# Patient Record
Sex: MALE | Race: WHITE | NOT HISPANIC OR LATINO | Employment: FULL TIME | URBAN - METROPOLITAN AREA
[De-identification: names, ages, dates, MRNs, and addresses within clinical notes are randomized per-mention and may not be internally consistent; named-entity substitution may affect disease eponyms.]

---

## 2022-04-10 ENCOUNTER — HISTORICAL (OUTPATIENT)
Dept: ADMINISTRATIVE | Facility: HOSPITAL | Age: 32
End: 2022-04-10

## 2022-04-25 VITALS
OXYGEN SATURATION: 99 % | DIASTOLIC BLOOD PRESSURE: 85 MMHG | HEIGHT: 66 IN | WEIGHT: 170 LBS | BODY MASS INDEX: 27.32 KG/M2 | SYSTOLIC BLOOD PRESSURE: 128 MMHG

## 2022-06-27 DIAGNOSIS — C62.90 TESTICULAR CANCER: Primary | ICD-10-CM

## 2022-07-13 ENCOUNTER — DOCUMENTATION ONLY (OUTPATIENT)
Dept: HEMATOLOGY/ONCOLOGY | Facility: CLINIC | Age: 32
End: 2022-07-13
Payer: OTHER GOVERNMENT

## 2022-07-13 NOTE — PROGRESS NOTES
Subjective:       Patient ID: Toño Steiner is a 32 y.o. male.    Chief Complaint: Consult (Testicular Cancer)      Diagnosis:  1.  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    Current Treatment:   1.  Observation    Treatment History:  1.  Left orchiectomy performed on 6/27/2022.     HPI  32-year-old male who noticed discomfort in his left testicle while working out, did a self exam and felt a lump in the left testicle.  He presented to the emergency department at [facility], underwent testicular ultrasound on 05/25/2022 that revealed a solid hypervascular mass in the left testis measuring 1.7 cm consistent with testicular neoplasm.  A CT scan of the chest with contrast done on 05/25/2022 revealed no evidence of metastatic disease.  CT of the abdomen and pelvis with and without contrast done on 05/25/2022 revealed a 1.6 x 1.3 cm left testicular mass with no evidence of metastatic disease.  Patient was admitted to the hospital.  Pre orchiectomy labs showed an AFP of 2, beta-hCG less than 1 and LDH of 187, these are all normal. He underwent a left orchiectomy on 05/27/2022, pathology revealed pure seminoma, 1.7 cm, pathologically staged as pT1a, N0, M0 stage I pure seminoma.  In early July 2022, he had an ultrasound that showed a hydrocele in no other abnormalities.  He presented to see me for initial consult on 07/14/2022.  At that visit, he had recovered from surgery.  He had no major complaints to discuss.      Interval History:   Patient presents to clinic for initial consult.      Past Medical History:   Diagnosis Date    Anxiety     Diverticulitis     Testicular cancer       Past Surgical History:   Procedure Laterality Date    COLONOSCOPY  2021    ORCHIECTOMY, LAPAROSCOPIC Left 05/26/2022     Social History     Socioeconomic History    Marital status:    Tobacco Use    Smoking status: Never Smoker    Smokeless tobacco: Current User    Tobacco comment: patient stated that  he chews nicotine gum at times   Substance and Sexual Activity    Alcohol use: Not Currently     Comment: patient stated that he has not drank in over 9 years.     Drug use: Never      Family History   Problem Relation Age of Onset    Hypertension Mother     Multiple myeloma Father     Hypertension Maternal Uncle     Diabetes Maternal Uncle     Hypertension Maternal Grandmother     Hypertension Maternal Grandfather       Review of patient's allergies indicates:  No Known Allergies   Review of Systems   Constitutional: Negative for appetite change and unexpected weight change.   HENT: Negative for mouth sores.    Eyes: Negative for visual disturbance.   Respiratory: Negative for cough and shortness of breath.    Cardiovascular: Negative for chest pain.   Gastrointestinal: Negative for abdominal pain and diarrhea.   Genitourinary: Negative for frequency.   Musculoskeletal: Negative for back pain.   Integumentary:  Negative for rash.   Neurological: Negative for headaches.   Hematological: Negative for adenopathy.   Psychiatric/Behavioral: The patient is nervous/anxious.          Objective:      Physical Exam  Vitals reviewed.   Constitutional:       General: He is awake.      Appearance: Normal appearance.   HENT:      Head: Normocephalic and atraumatic.      Right Ear: Hearing normal.      Left Ear: Hearing normal.      Nose: Nose normal.   Eyes:      General: Lids are normal. Vision grossly intact.      Extraocular Movements: Extraocular movements intact.      Conjunctiva/sclera: Conjunctivae normal.   Cardiovascular:      Rate and Rhythm: Normal rate and regular rhythm.      Pulses: Normal pulses.      Heart sounds: Normal heart sounds.   Pulmonary:      Effort: Pulmonary effort is normal.      Breath sounds: Normal breath sounds. No wheezing, rhonchi or rales.   Chest:   Breasts:      Right: No axillary adenopathy or supraclavicular adenopathy.      Left: No axillary adenopathy or supraclavicular  adenopathy.       Abdominal:      General: Bowel sounds are normal.      Palpations: Abdomen is soft.      Tenderness: There is no abdominal tenderness.   Musculoskeletal:      Cervical back: Full passive range of motion without pain.      Right lower leg: No edema.      Left lower leg: No edema.   Lymphadenopathy:      Cervical: No cervical adenopathy.      Upper Body:      Right upper body: No supraclavicular or axillary adenopathy.      Left upper body: No supraclavicular or axillary adenopathy.   Skin:     General: Skin is warm.   Neurological:      General: No focal deficit present.      Mental Status: He is alert and oriented to person, place, and time.   Psychiatric:         Attention and Perception: Attention normal.         Mood and Affect: Mood and affect normal.         Behavior: Behavior is cooperative.         LABS AND IMAGING REVIEWED IN EPIC          Assessment:   1.  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        Plan:       Patient has a stage I pure seminoma.  We discussed how to proceed moving forward.  I explained that due to the fact that he has stage I, he has a high chance of cure, and if he ever recurred he would still have a very high chance of care, I would opt for observation.  We discussed 1 dose of single agent carboplatin versus radiotherapy, my recommendation is observation.  He voiced understanding and agrees.    We will plan on aggressive surveillance per NCCN guidelines.  He will get a CT scan of the chest, abdomen, and pelvis at month 4 (September 2022), month 6 (November 2022), and month 12 (May 2023).  In year 2 and 3, we will scan him every 6 months, in years 4 and 5 we will scan him every year.  We will also check tumor markers at each visit.    CT scan of the chest, abdomen, and pelvis prior to follow up    Return to clinic in 2 months to review lab/scan results    CBC, CMP, LDH, AFP, and BHGC    Edward Hernandez II, MD I, Chitra Mehta LPN, acted  solely as a scribe for and in the presence of, Dr. Edward Hernandez who performed the service.

## 2022-07-14 ENCOUNTER — OFFICE VISIT (OUTPATIENT)
Dept: HEMATOLOGY/ONCOLOGY | Facility: CLINIC | Age: 32
End: 2022-07-14
Payer: OTHER GOVERNMENT

## 2022-07-14 ENCOUNTER — DOCUMENTATION ONLY (OUTPATIENT)
Dept: HEMATOLOGY/ONCOLOGY | Facility: CLINIC | Age: 32
End: 2022-07-14
Payer: OTHER GOVERNMENT

## 2022-07-14 VITALS
HEIGHT: 65 IN | HEART RATE: 96 BPM | SYSTOLIC BLOOD PRESSURE: 146 MMHG | OXYGEN SATURATION: 98 % | TEMPERATURE: 99 F | BODY MASS INDEX: 29.79 KG/M2 | WEIGHT: 178.81 LBS | DIASTOLIC BLOOD PRESSURE: 94 MMHG | RESPIRATION RATE: 18 BRPM

## 2022-07-14 DIAGNOSIS — C62.92 SEMINOMA OF LEFT TESTIS, STAGE 1: ICD-10-CM

## 2022-07-14 PROCEDURE — 99205 OFFICE O/P NEW HI 60 MIN: CPT | Mod: S$PBB,,, | Performed by: INTERNAL MEDICINE

## 2022-07-14 PROCEDURE — 99999 PR PBB SHADOW E&M-EST. PATIENT-LVL IV: CPT | Mod: PBBFAC,,, | Performed by: INTERNAL MEDICINE

## 2022-07-14 PROCEDURE — 99205 PR OFFICE/OUTPT VISIT, NEW, LEVL V, 60-74 MIN: ICD-10-PCS | Mod: S$PBB,,, | Performed by: INTERNAL MEDICINE

## 2022-07-14 PROCEDURE — 99999 PR PBB SHADOW E&M-EST. PATIENT-LVL IV: ICD-10-PCS | Mod: PBBFAC,,, | Performed by: INTERNAL MEDICINE

## 2022-07-14 PROCEDURE — 99214 OFFICE O/P EST MOD 30 MIN: CPT | Mod: PBBFAC | Performed by: INTERNAL MEDICINE

## 2022-07-14 RX ORDER — ACETAMINOPHEN 325 MG/1
325 TABLET ORAL
COMMUNITY

## 2022-07-14 NOTE — NURSING
I met with Vignesh and his wife to introduce myself and explain my role. He was emotional but relieved from the news he received today. We discussed the plan of care. They will reach out to me if any needs arise.

## 2022-08-25 DIAGNOSIS — C62.92 MALIGNANT NEOPLASM OF LEFT TESTIS, UNSPECIFIED WHETHER DESCENDED OR UNDESCENDED: Primary | ICD-10-CM

## 2022-08-29 ENCOUNTER — HOSPITAL ENCOUNTER (OUTPATIENT)
Dept: RADIOLOGY | Facility: HOSPITAL | Age: 32
Discharge: HOME | End: 2022-08-29
Attending: INTERNAL MEDICINE
Payer: OTHER GOVERNMENT

## 2022-08-29 DIAGNOSIS — C62.92 SEMINOMA OF LEFT TESTIS, STAGE 1: ICD-10-CM

## 2022-08-29 PROCEDURE — 25500020 PHARM REV CODE 255: Performed by: INTERNAL MEDICINE

## 2022-08-29 PROCEDURE — 74177 CT ABD & PELVIS W/CONTRAST: CPT | Mod: TC

## 2022-08-29 PROCEDURE — 71260 CT THORAX DX C+: CPT | Mod: TC

## 2022-08-29 RX ADMIN — IOPAMIDOL 100 ML: 755 INJECTION, SOLUTION INTRAVENOUS at 04:08

## 2022-08-29 RX ADMIN — DIATRIZOATE MEGLUMINE AND DIATRIZOATE SODIUM 30 ML: 660; 100 LIQUID ORAL; RECTAL at 03:08

## 2022-09-13 PROBLEM — C62.12: Status: ACTIVE | Noted: 2022-09-13

## 2022-09-14 ENCOUNTER — OFFICE VISIT (OUTPATIENT)
Dept: HEMATOLOGY/ONCOLOGY | Facility: CLINIC | Age: 32
End: 2022-09-14
Payer: OTHER GOVERNMENT

## 2022-09-14 VITALS
SYSTOLIC BLOOD PRESSURE: 133 MMHG | OXYGEN SATURATION: 97 % | HEIGHT: 65 IN | RESPIRATION RATE: 18 BRPM | DIASTOLIC BLOOD PRESSURE: 87 MMHG | BODY MASS INDEX: 29.9 KG/M2 | HEART RATE: 84 BPM | TEMPERATURE: 99 F | WEIGHT: 179.44 LBS

## 2022-09-14 DIAGNOSIS — C62.12 SEMINOMA OF DESCENDED TESTIS, LEFT: ICD-10-CM

## 2022-09-14 PROCEDURE — 99999 PR PBB SHADOW E&M-EST. PATIENT-LVL IV: ICD-10-PCS | Mod: PBBFAC,,, | Performed by: INTERNAL MEDICINE

## 2022-09-14 PROCEDURE — 99214 PR OFFICE/OUTPT VISIT, EST, LEVL IV, 30-39 MIN: ICD-10-PCS | Mod: S$PBB,,, | Performed by: INTERNAL MEDICINE

## 2022-09-14 PROCEDURE — 99999 PR PBB SHADOW E&M-EST. PATIENT-LVL IV: CPT | Mod: PBBFAC,,, | Performed by: INTERNAL MEDICINE

## 2022-09-14 PROCEDURE — 99214 OFFICE O/P EST MOD 30 MIN: CPT | Mod: S$PBB,,, | Performed by: INTERNAL MEDICINE

## 2022-09-14 PROCEDURE — 99214 OFFICE O/P EST MOD 30 MIN: CPT | Mod: PBBFAC | Performed by: INTERNAL MEDICINE

## 2022-09-14 NOTE — PROGRESS NOTES
Subjective:       Patient ID: Toño Steiner is a 32 y.o. male.    Chief Complaint: Follow-up (2 month. Patient stated no new problems )      Diagnosis:  1.  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    Current Treatment:   1.  Observation    Treatment History:  1.  Left orchiectomy performed on 6/27/2022.     HPI  32-year-old male who noticed discomfort in his left testicle while working out, did a self exam and felt a lump in the left testicle.  He presented to the emergency department at [facility], underwent testicular ultrasound on 05/25/2022 that revealed a solid hypervascular mass in the left testis measuring 1.7 cm consistent with testicular neoplasm.  A CT scan of the chest with contrast done on 05/25/2022 revealed no evidence of metastatic disease.  CT of the abdomen and pelvis with and without contrast done on 05/25/2022 revealed a 1.6 x 1.3 cm left testicular mass with no evidence of metastatic disease.  Patient was admitted to the hospital.  Pre orchiectomy labs showed an AFP of 2, beta-hCG less than 1 and LDH of 187, these are all normal. He underwent a left orchiectomy on 05/27/2022, pathology revealed pure seminoma, 1.7 cm, pathologically staged as pT1a, N0, M0 stage I pure seminoma.  In early July 2022, he had an ultrasound that showed a hydrocele in no other abnormalities.  He presented to see me for initial consult on 07/14/2022.  At that visit, he had recovered from surgery.  He had no major complaints to discuss.  Surveillance CT scans of the chest, abdomen, and pelvis started on 08/29/2022, this showed no evidence of metastatic disease.  Corresponding tumor markers on the same day were negative.      Interval History:   Patient presents follow up visit.  Overall, he is doing well.  His CT scans and tumor markers are negative for metastatic disease.  He is overall doing well.  He states that his anxiety has improved.      Past Medical History:   Diagnosis Date    Anxiety      Diverticulitis     Testicular cancer       Past Surgical History:   Procedure Laterality Date    COLONOSCOPY  2021    ORCHIECTOMY, LAPAROSCOPIC Left 05/26/2022     Social History     Socioeconomic History    Marital status:    Tobacco Use    Smoking status: Never    Smokeless tobacco: Current    Tobacco comments:     patient stated that he chews nicotine gum at times   Substance and Sexual Activity    Alcohol use: Not Currently     Comment: patient stated that he has not drank in over 9 years.     Drug use: Never      Family History   Problem Relation Age of Onset    Hypertension Mother     Multiple myeloma Father     Hypertension Maternal Uncle     Diabetes Maternal Uncle     Hypertension Maternal Grandmother     Hypertension Maternal Grandfather       Review of patient's allergies indicates:  No Known Allergies   Review of Systems   Constitutional:  Negative for appetite change and unexpected weight change.   HENT:  Negative for mouth sores.    Eyes:  Negative for visual disturbance.   Respiratory:  Negative for cough and shortness of breath.    Cardiovascular:  Negative for chest pain.   Gastrointestinal:  Negative for abdominal pain and diarrhea.   Genitourinary:  Negative for frequency.   Musculoskeletal:  Negative for back pain.   Integumentary:  Negative for rash.   Neurological:  Negative for headaches.   Hematological:  Negative for adenopathy.   Psychiatric/Behavioral:  The patient is nervous/anxious.        Objective:      Physical Exam  Vitals reviewed.   Constitutional:       General: He is awake.      Appearance: Normal appearance.   HENT:      Head: Normocephalic and atraumatic.      Right Ear: Hearing normal.      Left Ear: Hearing normal.      Nose: Nose normal.   Eyes:      General: Lids are normal. Vision grossly intact.      Extraocular Movements: Extraocular movements intact.      Conjunctiva/sclera: Conjunctivae normal.   Cardiovascular:      Rate and Rhythm: Normal rate and regular  rhythm.      Pulses: Normal pulses.      Heart sounds: Normal heart sounds.   Pulmonary:      Effort: Pulmonary effort is normal.      Breath sounds: Normal breath sounds. No wheezing, rhonchi or rales.   Abdominal:      General: Bowel sounds are normal.      Palpations: Abdomen is soft.      Tenderness: There is no abdominal tenderness.   Musculoskeletal:      Cervical back: Full passive range of motion without pain.      Right lower leg: No edema.      Left lower leg: No edema.   Lymphadenopathy:      Cervical: No cervical adenopathy.      Upper Body:      Right upper body: No supraclavicular or axillary adenopathy.      Left upper body: No supraclavicular or axillary adenopathy.   Skin:     General: Skin is warm.   Neurological:      General: No focal deficit present.      Mental Status: He is alert and oriented to person, place, and time.   Psychiatric:         Attention and Perception: Attention normal.         Mood and Affect: Mood and affect normal.         Behavior: Behavior is cooperative.       LABS AND IMAGING REVIEWED IN EPIC          Assessment:   1.  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        Plan:       Patient has a stage I pure seminoma.  We discussed how to proceed moving forward.  I explained that due to the fact that he has stage I, he has a high chance of cure, and if he ever recurred he would still have a very high chance of care, I would opt for observation.  We discussed 1 dose of single agent carboplatin versus radiotherapy, my recommendation is observation.  He voiced understanding and agrees.    We will plan on aggressive surveillance per NCCN guidelines.  He will get a CT scan of the chest, abdomen, and pelvis at month 4 (September 2022), month 8 (January 2023), and month 12 (May 2023).  In year 2 and 3, we will scan him every 6 months, in years 4 and 5 we will scan him every year.  We will also check tumor markers at each visit.    CT scan of the chest, abdomen,  and pelvis prior to follow up    Return to clinic in 4 months to review lab/scan results    CBC, CMP, LDH, AFP, and BHGC    Edward Hernandez II, MD I, Chitra Mehta LPN, acted solely as a scribe for and in the presence of, Dr. Edward Hernandez who performed the service.

## 2023-01-10 ENCOUNTER — HOSPITAL ENCOUNTER (OUTPATIENT)
Dept: RADIOLOGY | Facility: HOSPITAL | Age: 33
Discharge: HOME | End: 2023-01-10
Attending: INTERNAL MEDICINE
Payer: OTHER GOVERNMENT

## 2023-01-10 DIAGNOSIS — C62.12 SEMINOMA OF DESCENDED TESTIS, LEFT: ICD-10-CM

## 2023-01-10 PROCEDURE — 74177 CT ABD & PELVIS W/CONTRAST: CPT | Mod: TC

## 2023-01-10 PROCEDURE — 71260 CT THORAX DX C+: CPT | Mod: TC

## 2023-01-10 PROCEDURE — 25500020 PHARM REV CODE 255: Mod: 59 | Performed by: INTERNAL MEDICINE

## 2023-01-10 RX ADMIN — IOPAMIDOL 100 ML: 755 INJECTION, SOLUTION INTRAVENOUS at 09:01

## 2023-01-10 RX ADMIN — DIATRIZOATE MEGLUMINE AND DIATRIZOATE SODIUM 30 ML: 660; 100 LIQUID ORAL; RECTAL at 09:01

## 2023-01-17 ENCOUNTER — OFFICE VISIT (OUTPATIENT)
Dept: HEMATOLOGY/ONCOLOGY | Facility: CLINIC | Age: 33
End: 2023-01-17
Payer: OTHER GOVERNMENT

## 2023-01-17 VITALS
HEIGHT: 65 IN | BODY MASS INDEX: 29.97 KG/M2 | SYSTOLIC BLOOD PRESSURE: 126 MMHG | TEMPERATURE: 99 F | OXYGEN SATURATION: 98 % | DIASTOLIC BLOOD PRESSURE: 86 MMHG | RESPIRATION RATE: 18 BRPM | HEART RATE: 65 BPM | WEIGHT: 179.88 LBS

## 2023-01-17 DIAGNOSIS — C62.92 MALIGNANT NEOPLASM OF LEFT TESTIS, UNSPECIFIED WHETHER DESCENDED OR UNDESCENDED: ICD-10-CM

## 2023-01-17 DIAGNOSIS — C62.12 SEMINOMA OF DESCENDED TESTIS, LEFT: Primary | ICD-10-CM

## 2023-01-17 PROCEDURE — 99999 PR PBB SHADOW E&M-EST. PATIENT-LVL IV: CPT | Mod: PBBFAC,,, | Performed by: INTERNAL MEDICINE

## 2023-01-17 PROCEDURE — 99214 PR OFFICE/OUTPT VISIT, EST, LEVL IV, 30-39 MIN: ICD-10-PCS | Mod: S$PBB,,, | Performed by: INTERNAL MEDICINE

## 2023-01-17 PROCEDURE — 99214 OFFICE O/P EST MOD 30 MIN: CPT | Mod: S$PBB,,, | Performed by: INTERNAL MEDICINE

## 2023-01-17 PROCEDURE — 99214 OFFICE O/P EST MOD 30 MIN: CPT | Mod: PBBFAC | Performed by: INTERNAL MEDICINE

## 2023-01-17 PROCEDURE — 99999 PR PBB SHADOW E&M-EST. PATIENT-LVL IV: ICD-10-PCS | Mod: PBBFAC,,, | Performed by: INTERNAL MEDICINE

## 2023-01-17 NOTE — PROGRESS NOTES
Subjective:       Patient ID: Toño Steiner is a 32 y.o. male.    Chief Complaint: No chief complaint on file.        Diagnosis:  1.  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    Current Treatment:   1.  Observation    Treatment History:  1.  Left orchiectomy performed on 6/27/2022.     HPI:  32-year-old male who noticed discomfort in his left testicle while working out, did a self exam and felt a lump in the left testicle.  He presented to the emergency department at [facility], underwent testicular ultrasound on 05/25/2022 that revealed a solid hypervascular mass in the left testis measuring 1.7 cm consistent with testicular neoplasm.  A CT scan of the chest with contrast done on 05/25/2022 revealed no evidence of metastatic disease.  CT of the abdomen and pelvis with and without contrast done on 05/25/2022 revealed a 1.6 x 1.3 cm left testicular mass with no evidence of metastatic disease.  Patient was admitted to the hospital.  Pre orchiectomy labs showed an AFP of 2, beta-hCG less than 1 and LDH of 187, these are all normal. He underwent a left orchiectomy on 05/27/2022, pathology revealed pure seminoma, 1.7 cm, pathologically staged as pT1a, N0, M0 stage I pure seminoma.  In early July 2022, he had an ultrasound that showed a hydrocele in no other abnormalities.  He presented to see me for initial consult on 07/14/2022.  At that visit, he had recovered from surgery.  He had no major complaints to discuss.  Surveillance CT scans of the chest, abdomen, and pelvis started on 08/29/2022, this showed no evidence of metastatic disease.  Corresponding tumor markers on the same day were negative. Most recent scans and labs was done on 1/10/2023 and continued to show no evidence of disease.       Interval History:   Patient presents follow up visit.  Overall, he is doing well.  His CT scans and tumor markers are negative for metastatic disease.  He states he had a diverticulitis flare since last  office visit.  Otherwise, no issues to discuss today.       Past Medical History:   Diagnosis Date    Anxiety     Diverticulitis     Testicular cancer       Past Surgical History:   Procedure Laterality Date    COLONOSCOPY  2021    ORCHIECTOMY, LAPAROSCOPIC Left 05/26/2022     Social History     Socioeconomic History    Marital status:    Tobacco Use    Smoking status: Never    Smokeless tobacco: Current    Tobacco comments:     patient stated that he chews nicotine gum at times   Substance and Sexual Activity    Alcohol use: Not Currently     Comment: patient stated that he has not drank in over 9 years.     Drug use: Never      Family History   Problem Relation Age of Onset    Hypertension Mother     Multiple myeloma Father     Hypertension Maternal Uncle     Diabetes Maternal Uncle     Hypertension Maternal Grandmother     Hypertension Maternal Grandfather       Review of patient's allergies indicates:  No Known Allergies   Review of Systems   Constitutional:  Negative for chills, diaphoresis, fatigue, fever and unexpected weight change.   HENT:  Negative for nasal congestion, mouth sores, sinus pressure/congestion and sore throat.    Eyes:  Negative for pain and visual disturbance.   Respiratory:  Negative for cough, chest tightness and shortness of breath.    Cardiovascular:  Negative for chest pain, palpitations and leg swelling.   Gastrointestinal:  Negative for abdominal distention, abdominal pain, blood in stool, constipation and diarrhea.   Genitourinary:  Negative for dysuria, frequency and hematuria.   Musculoskeletal:  Negative for arthralgias and back pain.   Integumentary:  Negative for rash.   Neurological:  Negative for dizziness, weakness, numbness and headaches.   Hematological:  Negative for adenopathy.   Psychiatric/Behavioral:  Negative for confusion.        Objective:      Physical Exam  Vitals reviewed.   Constitutional:       General: He is awake.      Appearance: Normal appearance.    HENT:      Head: Normocephalic and atraumatic.      Right Ear: Hearing normal.      Left Ear: Hearing normal.      Nose: Nose normal.   Eyes:      General: Lids are normal. Vision grossly intact.      Extraocular Movements: Extraocular movements intact.      Conjunctiva/sclera: Conjunctivae normal.   Cardiovascular:      Rate and Rhythm: Normal rate and regular rhythm.      Pulses: Normal pulses.      Heart sounds: Normal heart sounds.   Pulmonary:      Effort: Pulmonary effort is normal.      Breath sounds: Normal breath sounds. No wheezing, rhonchi or rales.   Abdominal:      General: Bowel sounds are normal.      Palpations: Abdomen is soft.      Tenderness: There is no abdominal tenderness.   Musculoskeletal:      Cervical back: Full passive range of motion without pain.      Right lower leg: No edema.      Left lower leg: No edema.   Lymphadenopathy:      Cervical: No cervical adenopathy.      Upper Body:      Right upper body: No supraclavicular or axillary adenopathy.      Left upper body: No supraclavicular or axillary adenopathy.   Skin:     General: Skin is warm.   Neurological:      General: No focal deficit present.      Mental Status: He is alert and oriented to person, place, and time.   Psychiatric:         Attention and Perception: Attention normal.         Mood and Affect: Mood and affect normal.         Behavior: Behavior is cooperative.       LABS AND IMAGING REVIEWED IN EPIC          Assessment:   1.  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        Plan:       Patient has a stage I pure seminoma.  We discussed how to proceed moving forward.  I explained that due to the fact that he has stage I, he has a high chance of cure, and if he ever recurred he would still have a very high chance of care, I would opt for observation.  We discussed 1 dose of single agent carboplatin versus radiotherapy, my recommendation is observation.  He voiced understanding and agrees.    We will  plan on aggressive surveillance per NCCN guidelines.  He will get a CT scan of the chest, abdomen, and pelvis at month 4 (September 2022), month 8 (January 2023), and month 12 (May 2023).  In year 2 and 3, we will scan him every 6 months, in years 4 and 5 we will scan him every year.  We will also check tumor markers at each visit.    CT scan of the chest, abdomen, and pelvis prior to follow up    Return to clinic in 4 months to review lab/scan results    CBC, CMP, LDH, AFP, and GC    Edward Hernandez II, MD      I, Chitra Mehta LPN, acted solely as a scribe for and in the presence of, Dr. Edward Hernandez who performed the service.

## 2023-05-11 ENCOUNTER — HOSPITAL ENCOUNTER (OUTPATIENT)
Dept: RADIOLOGY | Facility: HOSPITAL | Age: 33
Discharge: HOME | End: 2023-05-11
Attending: INTERNAL MEDICINE
Payer: OTHER GOVERNMENT

## 2023-05-11 DIAGNOSIS — C62.92 MALIGNANT NEOPLASM OF LEFT TESTIS, UNSPECIFIED WHETHER DESCENDED OR UNDESCENDED: ICD-10-CM

## 2023-05-11 DIAGNOSIS — C62.12 SEMINOMA OF DESCENDED TESTIS, LEFT: ICD-10-CM

## 2023-05-11 PROCEDURE — 74177 CT ABD & PELVIS W/CONTRAST: CPT | Mod: TC

## 2023-05-11 PROCEDURE — 71260 CT THORAX DX C+: CPT | Mod: TC

## 2023-05-11 PROCEDURE — 25500020 PHARM REV CODE 255: Performed by: INTERNAL MEDICINE

## 2023-05-11 RX ADMIN — IOPAMIDOL 100 ML: 755 INJECTION, SOLUTION INTRAVENOUS at 04:05

## 2023-05-11 RX ADMIN — DIATRIZOATE MEGLUMINE AND DIATRIZOATE SODIUM 30 ML: 660; 100 LIQUID ORAL; RECTAL at 03:05

## 2023-05-12 NOTE — PROGRESS NOTES
Subjective:       Patient ID: Toño Steiner is a 33 y.o. male.    Chief Complaint: No Concerns today        Diagnosis:  1.  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    Current Treatment:   1.  Observation    Treatment History:  1.  Left orchiectomy performed on 6/27/2022.     HPI:  33-year-old male who noticed discomfort in his left testicle while working out, did a self exam and felt a lump in the left testicle.  He presented to the emergency department at [facility], underwent testicular ultrasound on 05/25/2022 that revealed a solid hypervascular mass in the left testis measuring 1.7 cm consistent with testicular neoplasm.  A CT scan of the chest with contrast done on 05/25/2022 revealed no evidence of metastatic disease.  CT of the abdomen and pelvis with and without contrast done on 05/25/2022 revealed a 1.6 x 1.3 cm left testicular mass with no evidence of metastatic disease.  Patient was admitted to the hospital.  Pre orchiectomy labs showed an AFP of 2, beta-hCG less than 1 and LDH of 187, these are all normal. He underwent a left orchiectomy on 05/27/2022, pathology revealed pure seminoma, 1.7 cm, pathologically staged as pT1a, N0, M0 stage I pure seminoma.  In early July 2022, he had an ultrasound that showed a hydrocele in no other abnormalities.  He presented to see me for initial consult on 07/14/2022.  At that visit, he had recovered from surgery.  He had no major complaints to discuss.  Surveillance CT scans of the chest, abdomen, and pelvis started on 08/29/2022, this showed no evidence of metastatic disease.  Corresponding tumor markers on the same day were negative. Most recent scans and labs was done on 05/11/2023 and continued to show no evidence of disease.       Interval History:   Patient presents follow up visit.  Overall, he is doing well.  His CT scans and tumor markers are negative for metastatic disease. He does report discomfort to his right nipple for about a  month now.      Past Medical History:   Diagnosis Date    Anxiety     Diverticulitis     Testicular cancer       Past Surgical History:   Procedure Laterality Date    COLONOSCOPY  2021    ORCHIECTOMY, LAPAROSCOPIC Left 05/26/2022     Social History     Socioeconomic History    Marital status:    Tobacco Use    Smoking status: Never    Smokeless tobacco: Current    Tobacco comments:     patient stated that he chews nicotine gum at times   Substance and Sexual Activity    Alcohol use: Not Currently     Comment: patient stated that he has not drank in over 9 years.     Drug use: Never      Family History   Problem Relation Age of Onset    Hypertension Mother     Multiple myeloma Father     Hypertension Maternal Uncle     Diabetes Maternal Uncle     Hypertension Maternal Grandmother     Hypertension Maternal Grandfather       Review of patient's allergies indicates:  No Known Allergies   Review of Systems   Constitutional:  Negative for appetite change and unexpected weight change.   HENT:  Negative for mouth sores.    Eyes:  Negative for visual disturbance.   Respiratory:  Negative for cough and shortness of breath.    Cardiovascular:  Negative for chest pain.   Gastrointestinal:  Negative for abdominal pain and diarrhea.   Genitourinary:  Negative for frequency.   Musculoskeletal:  Negative for back pain.   Integumentary:  Negative for rash.   Neurological:  Negative for headaches.   Hematological:  Negative for adenopathy.   Psychiatric/Behavioral:  The patient is not nervous/anxious.        Objective:      Physical Exam  Vitals reviewed.   Constitutional:       General: He is awake.      Appearance: Normal appearance.   HENT:      Head: Normocephalic and atraumatic.      Right Ear: Hearing normal.      Left Ear: Hearing normal.      Nose: Nose normal.   Eyes:      General: Lids are normal. Vision grossly intact.      Extraocular Movements: Extraocular movements intact.      Conjunctiva/sclera: Conjunctivae  normal.   Cardiovascular:      Rate and Rhythm: Normal rate and regular rhythm.      Pulses: Normal pulses.      Heart sounds: Normal heart sounds.   Pulmonary:      Effort: Pulmonary effort is normal.      Breath sounds: Normal breath sounds. No wheezing, rhonchi or rales.   Abdominal:      General: Bowel sounds are normal.      Palpations: Abdomen is soft.      Tenderness: There is no abdominal tenderness.   Musculoskeletal:      Cervical back: Full passive range of motion without pain.      Right lower leg: No edema.      Left lower leg: No edema.   Lymphadenopathy:      Cervical: No cervical adenopathy.      Upper Body:      Right upper body: No supraclavicular or axillary adenopathy.      Left upper body: No supraclavicular or axillary adenopathy.   Skin:     General: Skin is warm.   Neurological:      General: No focal deficit present.      Mental Status: He is alert and oriented to person, place, and time.   Psychiatric:         Attention and Perception: Attention normal.         Mood and Affect: Mood and affect normal.         Behavior: Behavior is cooperative.       LABS AND IMAGING REVIEWED IN EPIC          Assessment:   1.  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        Plan:       Patient has a stage I pure seminoma.  We discussed how to proceed moving forward.  I explained that due to the fact that he has stage I, he has a high chance of cure, and if he ever recurred he would still have a very high chance of care, I would opt for observation.  We discussed 1 dose of single agent carboplatin versus radiotherapy, my recommendation is observation.  He voiced understanding and agrees.    We will plan on aggressive surveillance per NCCN guidelines.  He will get a CT scan of the chest, abdomen, and pelvis at month 4 (September 2022), month 8 (January 2023), and month 12 (May 2023).  In year 2 and 3, we will scan him every 6 months, in years 4 and 5 we will scan him every year.  We will  also check tumor markers at each visit.    CT scan of the chest, abdomen, and pelvis done on 5/11/2023 show REGINO.    LH, FSH, and estradiol today    Ultrasound of right breast ordered    Return to clinic in 6 months to review lab/scan results    CBC, CMP, LDH, AFP, and BHGC    Edward Hernandez II, MD I, Chitra Mehta LPN, acted solely as a scribe for and in the presence of, Dr. Edward Hernandez who performed the service.

## 2023-05-18 ENCOUNTER — OFFICE VISIT (OUTPATIENT)
Dept: HEMATOLOGY/ONCOLOGY | Facility: CLINIC | Age: 33
End: 2023-05-18
Payer: OTHER GOVERNMENT

## 2023-05-18 VITALS
HEIGHT: 65 IN | SYSTOLIC BLOOD PRESSURE: 126 MMHG | WEIGHT: 176 LBS | OXYGEN SATURATION: 97 % | DIASTOLIC BLOOD PRESSURE: 86 MMHG | BODY MASS INDEX: 29.32 KG/M2 | HEART RATE: 72 BPM | TEMPERATURE: 98 F

## 2023-05-18 DIAGNOSIS — C62.12 SEMINOMA OF DESCENDED TESTIS, LEFT: Primary | ICD-10-CM

## 2023-05-18 DIAGNOSIS — N64.4 NIPPLE PAIN: ICD-10-CM

## 2023-05-18 LAB
ESTRADIOL SERPL HS-MCNC: <24 PG/ML
FSH SERPL-ACNC: 7.92 MIU/ML
LH SERPL-ACNC: 2.96 MIU/ML

## 2023-05-18 PROCEDURE — 99214 PR OFFICE/OUTPT VISIT, EST, LEVL IV, 30-39 MIN: ICD-10-PCS | Mod: S$PBB,,, | Performed by: INTERNAL MEDICINE

## 2023-05-18 PROCEDURE — 99999 PR PBB SHADOW E&M-EST. PATIENT-LVL IV: CPT | Mod: PBBFAC,,, | Performed by: INTERNAL MEDICINE

## 2023-05-18 PROCEDURE — 99214 OFFICE O/P EST MOD 30 MIN: CPT | Mod: PBBFAC | Performed by: INTERNAL MEDICINE

## 2023-05-18 PROCEDURE — 99999 PR PBB SHADOW E&M-EST. PATIENT-LVL IV: ICD-10-PCS | Mod: PBBFAC,,, | Performed by: INTERNAL MEDICINE

## 2023-05-18 PROCEDURE — 82670 ASSAY OF TOTAL ESTRADIOL: CPT | Performed by: INTERNAL MEDICINE

## 2023-05-18 PROCEDURE — 99214 OFFICE O/P EST MOD 30 MIN: CPT | Mod: S$PBB,,, | Performed by: INTERNAL MEDICINE

## 2023-05-18 PROCEDURE — 83002 ASSAY OF GONADOTROPIN (LH): CPT | Performed by: INTERNAL MEDICINE

## 2023-05-18 PROCEDURE — 36415 COLL VENOUS BLD VENIPUNCTURE: CPT | Performed by: INTERNAL MEDICINE

## 2023-05-18 PROCEDURE — 83001 ASSAY OF GONADOTROPIN (FSH): CPT | Performed by: INTERNAL MEDICINE

## 2023-06-01 DIAGNOSIS — N64.4 NIPPLE PAIN: Primary | ICD-10-CM

## 2023-06-01 DIAGNOSIS — C62.92 MALIGNANT NEOPLASM OF LEFT TESTIS, UNSPECIFIED WHETHER DESCENDED OR UNDESCENDED: ICD-10-CM

## 2023-06-01 DIAGNOSIS — C62.12 SEMINOMA OF DESCENDED TESTIS, LEFT: ICD-10-CM

## 2023-06-02 DIAGNOSIS — C62.12 SEMINOMA OF DESCENDED TESTIS, LEFT: ICD-10-CM

## 2023-06-02 DIAGNOSIS — N64.4 NIPPLE PAIN: Primary | ICD-10-CM

## 2023-06-02 DIAGNOSIS — C62.92 MALIGNANT NEOPLASM OF LEFT TESTIS, UNSPECIFIED WHETHER DESCENDED OR UNDESCENDED: ICD-10-CM

## 2023-06-12 ENCOUNTER — HOSPITAL ENCOUNTER (OUTPATIENT)
Dept: RADIOLOGY | Facility: HOSPITAL | Age: 33
Discharge: HOME | End: 2023-06-12
Attending: INTERNAL MEDICINE
Payer: OTHER GOVERNMENT

## 2023-06-12 DIAGNOSIS — C62.12 SEMINOMA OF DESCENDED TESTIS, LEFT: ICD-10-CM

## 2023-06-12 DIAGNOSIS — C62.92 MALIGNANT NEOPLASM OF LEFT TESTIS, UNSPECIFIED WHETHER DESCENDED OR UNDESCENDED: ICD-10-CM

## 2023-06-12 DIAGNOSIS — N64.4 NIPPLE PAIN: ICD-10-CM

## 2023-06-12 PROCEDURE — 77062 MAMMO DIGITAL DIAGNOSTIC BILAT WITH TOMO: ICD-10-PCS | Mod: 26,,, | Performed by: STUDENT IN AN ORGANIZED HEALTH CARE EDUCATION/TRAINING PROGRAM

## 2023-06-12 PROCEDURE — 77066 DX MAMMO INCL CAD BI: CPT | Mod: TC

## 2023-06-12 PROCEDURE — 77066 MAMMO DIGITAL DIAGNOSTIC BILAT WITH TOMO: ICD-10-PCS | Mod: 26,,, | Performed by: STUDENT IN AN ORGANIZED HEALTH CARE EDUCATION/TRAINING PROGRAM

## 2023-06-12 PROCEDURE — 77066 DX MAMMO INCL CAD BI: CPT | Mod: 26,,, | Performed by: STUDENT IN AN ORGANIZED HEALTH CARE EDUCATION/TRAINING PROGRAM

## 2023-06-12 PROCEDURE — 77062 BREAST TOMOSYNTHESIS BI: CPT | Mod: 26,,, | Performed by: STUDENT IN AN ORGANIZED HEALTH CARE EDUCATION/TRAINING PROGRAM

## 2023-11-14 ENCOUNTER — HOSPITAL ENCOUNTER (OUTPATIENT)
Dept: RADIOLOGY | Facility: HOSPITAL | Age: 33
Discharge: HOME | End: 2023-11-14
Attending: INTERNAL MEDICINE
Payer: OTHER GOVERNMENT

## 2023-11-14 DIAGNOSIS — C62.12 SEMINOMA OF DESCENDED TESTIS, LEFT: ICD-10-CM

## 2023-11-14 PROCEDURE — 71260 CT THORAX DX C+: CPT | Mod: TC

## 2023-11-14 PROCEDURE — 25500020 PHARM REV CODE 255: Performed by: INTERNAL MEDICINE

## 2023-11-14 PROCEDURE — 74177 CT ABD & PELVIS W/CONTRAST: CPT | Mod: TC

## 2023-11-14 RX ADMIN — IOPAMIDOL 100 ML: 755 INJECTION, SOLUTION INTRAVENOUS at 01:11

## 2023-11-16 ENCOUNTER — OFFICE VISIT (OUTPATIENT)
Dept: HEMATOLOGY/ONCOLOGY | Facility: CLINIC | Age: 33
End: 2023-11-16
Payer: OTHER GOVERNMENT

## 2023-11-16 VITALS
OXYGEN SATURATION: 95 % | BODY MASS INDEX: 30.1 KG/M2 | HEART RATE: 72 BPM | DIASTOLIC BLOOD PRESSURE: 84 MMHG | SYSTOLIC BLOOD PRESSURE: 136 MMHG | WEIGHT: 180.69 LBS | HEIGHT: 65 IN | RESPIRATION RATE: 15 BRPM

## 2023-11-16 DIAGNOSIS — C62.12 SEMINOMA OF DESCENDED TESTIS, LEFT: Primary | ICD-10-CM

## 2023-11-16 PROCEDURE — 99999 PR PBB SHADOW E&M-EST. PATIENT-LVL III: ICD-10-PCS | Mod: PBBFAC,,, | Performed by: INTERNAL MEDICINE

## 2023-11-16 PROCEDURE — 99214 PR OFFICE/OUTPT VISIT, EST, LEVL IV, 30-39 MIN: ICD-10-PCS | Mod: S$PBB,,, | Performed by: INTERNAL MEDICINE

## 2023-11-16 PROCEDURE — 99999 PR PBB SHADOW E&M-EST. PATIENT-LVL III: CPT | Mod: PBBFAC,,, | Performed by: INTERNAL MEDICINE

## 2023-11-16 PROCEDURE — 99214 OFFICE O/P EST MOD 30 MIN: CPT | Mod: S$PBB,,, | Performed by: INTERNAL MEDICINE

## 2023-11-16 PROCEDURE — 99213 OFFICE O/P EST LOW 20 MIN: CPT | Mod: PBBFAC | Performed by: INTERNAL MEDICINE

## 2023-11-16 NOTE — PROGRESS NOTES
Subjective:       Patient ID: Toño Steiner is a 33 y.o. male.    Chief Complaint: Follow-up (No concerns today)        Diagnosis:  1.  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    Current Treatment:   1.  Observation    Treatment History:  1.  Left orchiectomy performed on 6/27/2022.     HPI:  33-year-old male who noticed discomfort in his left testicle while working out, did a self exam and felt a lump in the left testicle.  He presented to the emergency department at [facility], underwent testicular ultrasound on 05/25/2022 that revealed a solid hypervascular mass in the left testis measuring 1.7 cm consistent with testicular neoplasm.  A CT scan of the chest with contrast done on 05/25/2022 revealed no evidence of metastatic disease.  CT of the abdomen and pelvis with and without contrast done on 05/25/2022 revealed a 1.6 x 1.3 cm left testicular mass with no evidence of metastatic disease.  Patient was admitted to the hospital.  Pre orchiectomy labs showed an AFP of 2, beta-hCG less than 1 and LDH of 187, these are all normal. He underwent a left orchiectomy on 05/27/2022, pathology revealed pure seminoma, 1.7 cm, pathologically staged as pT1a, N0, M0 stage I pure seminoma.  In early July 2022, he had an ultrasound that showed a hydrocele in no other abnormalities.  He presented to see me for initial consult on 07/14/2022.  At that visit, he had recovered from surgery.  He had no major complaints to discuss.  Surveillance CT scans of the chest, abdomen, and pelvis started on 08/29/2022, this showed no evidence of metastatic disease.  Corresponding tumor markers on the same day were negative. Most recent scans and labs was done on 11/14/2023 and continued to show no evidence of disease (LDH slightly elevated at 232, other tumor markers are normal).      Interval History:   Patient presents follow up visit.  Overall, he is doing well.  He was slightly worried about his LDH, but otherwise is  doing great.  No other issues.       Past Medical History:   Diagnosis Date    Anxiety     Diverticulitis     Testicular cancer       Past Surgical History:   Procedure Laterality Date    COLONOSCOPY  2021    ORCHIECTOMY, LAPAROSCOPIC Left 05/26/2022     Social History     Socioeconomic History    Marital status:    Tobacco Use    Smoking status: Never    Smokeless tobacco: Current    Tobacco comments:     patient stated that he chews nicotine gum at times   Substance and Sexual Activity    Alcohol use: Not Currently     Comment: patient stated that he has not drank in over 9 years.     Drug use: Never      Family History   Problem Relation Age of Onset    Hypertension Mother     Multiple myeloma Father     Hypertension Maternal Uncle     Diabetes Maternal Uncle     Hypertension Maternal Grandmother     Hypertension Maternal Grandfather       Review of patient's allergies indicates:  No Known Allergies   Review of Systems   Constitutional:  Negative for appetite change and unexpected weight change.   HENT:  Negative for mouth sores.    Eyes:  Negative for visual disturbance.   Respiratory:  Negative for cough and shortness of breath.    Cardiovascular:  Negative for chest pain.   Gastrointestinal:  Negative for abdominal pain and diarrhea.   Genitourinary:  Negative for frequency.   Musculoskeletal:  Negative for back pain.   Integumentary:  Negative for rash.   Neurological:  Negative for headaches.   Hematological:  Negative for adenopathy.   Psychiatric/Behavioral:  The patient is not nervous/anxious.          Objective:      Physical Exam  Vitals reviewed.   Constitutional:       General: He is awake.      Appearance: Normal appearance.   HENT:      Head: Normocephalic and atraumatic.      Right Ear: Hearing normal.      Left Ear: Hearing normal.      Nose: Nose normal.   Eyes:      General: Lids are normal. Vision grossly intact.      Extraocular Movements: Extraocular movements intact.       Conjunctiva/sclera: Conjunctivae normal.   Cardiovascular:      Rate and Rhythm: Normal rate and regular rhythm.      Pulses: Normal pulses.      Heart sounds: Normal heart sounds.   Pulmonary:      Effort: Pulmonary effort is normal.      Breath sounds: Normal breath sounds. No wheezing, rhonchi or rales.   Abdominal:      General: Bowel sounds are normal.      Palpations: Abdomen is soft.      Tenderness: There is no abdominal tenderness.   Musculoskeletal:      Cervical back: Full passive range of motion without pain.      Right lower leg: No edema.      Left lower leg: No edema.   Lymphadenopathy:      Cervical: No cervical adenopathy.      Upper Body:      Right upper body: No supraclavicular or axillary adenopathy.      Left upper body: No supraclavicular or axillary adenopathy.   Skin:     General: Skin is warm.   Neurological:      General: No focal deficit present.      Mental Status: He is alert and oriented to person, place, and time.   Psychiatric:         Attention and Perception: Attention normal.         Mood and Affect: Mood and affect normal.         Behavior: Behavior is cooperative.         LABS AND IMAGING REVIEWED IN EPIC          Assessment:   1.  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        Plan:       Patient has a stage I pure seminoma.  We discussed how to proceed moving forward.  I explained that due to the fact that he has stage I, he has a high chance of cure, and if he ever recurred he would still have a very high chance of care, I would opt for observation.  We discussed 1 dose of single agent carboplatin versus radiotherapy, my recommendation is observation.  He voiced understanding and agrees.    We will plan on aggressive surveillance per NCCN guidelines.  He will get a CT scan of the chest, abdomen, and pelvis at month 4 (September 2022), month 8 (January 2023), and month 12 (May 2023).  In year 2 (May 2024) and 3 (May 2025), we will scan him every 6 months,  in years 4 (May 2026) and 5 (May 2027) we will scan him every year.  We will also check tumor markers at each visit.    CT scan of the chest, abdomen, and pelvis done on 11/14/2023 show REGINO.    Return to clinic in 6 months to review lab/scan results    CBC, CMP, LDH, AFP, and GC    Edward Hernandez II, MD I, Chitra Mehta LPN, acted solely as a scribe for and in the presence of, Dr. Edward Hernandez who performed the service.

## 2024-05-13 ENCOUNTER — HOSPITAL ENCOUNTER (OUTPATIENT)
Dept: RADIOLOGY | Facility: HOSPITAL | Age: 34
Discharge: HOME | End: 2024-05-13
Attending: INTERNAL MEDICINE
Payer: OTHER GOVERNMENT

## 2024-05-13 DIAGNOSIS — C62.12 SEMINOMA OF DESCENDED TESTIS, LEFT: ICD-10-CM

## 2024-05-13 LAB
AFP-TM SERPL-MCNC: 2.1 NG/ML
ALBUMIN SERPL-MCNC: 4.3 G/DL (ref 3.5–5)
ALBUMIN/GLOB SERPL: 1.4 RATIO (ref 1.1–2)
ALP SERPL-CCNC: 83 UNIT/L (ref 40–150)
ALT SERPL-CCNC: 22 UNIT/L (ref 0–55)
AST SERPL-CCNC: 22 UNIT/L (ref 5–34)
BASOPHILS # BLD AUTO: 0.07 X10(3)/MCL
BASOPHILS NFR BLD AUTO: 0.7 %
BILIRUB SERPL-MCNC: 0.5 MG/DL
BUN SERPL-MCNC: 24.9 MG/DL (ref 8.9–20.6)
CALCIUM SERPL-MCNC: 9.3 MG/DL (ref 8.4–10.2)
CHLORIDE SERPL-SCNC: 106 MMOL/L (ref 98–107)
CO2 SERPL-SCNC: 26 MMOL/L (ref 22–29)
CREAT SERPL-MCNC: 0.87 MG/DL (ref 0.73–1.18)
EOSINOPHIL # BLD AUTO: 0.11 X10(3)/MCL (ref 0–0.9)
EOSINOPHIL NFR BLD AUTO: 1.1 %
ERYTHROCYTE [DISTWIDTH] IN BLOOD BY AUTOMATED COUNT: 11.3 % (ref 11.5–17)
GFR SERPLBLD CREATININE-BSD FMLA CKD-EPI: >60 ML/MIN/1.73/M2
GLOBULIN SER-MCNC: 3 GM/DL (ref 2.4–3.5)
GLUCOSE SERPL-MCNC: 97 MG/DL (ref 74–100)
HCT VFR BLD AUTO: 47.2 % (ref 42–52)
HGB BLD-MCNC: 16.4 G/DL (ref 14–18)
IMM GRANULOCYTES # BLD AUTO: 0.02 X10(3)/MCL (ref 0–0.04)
IMM GRANULOCYTES NFR BLD AUTO: 0.2 %
LDH SERPL-CCNC: 190 U/L (ref 125–220)
LYMPHOCYTES # BLD AUTO: 2.84 X10(3)/MCL (ref 0.6–4.6)
LYMPHOCYTES NFR BLD AUTO: 29 %
MCH RBC QN AUTO: 31.4 PG (ref 27–31)
MCHC RBC AUTO-ENTMCNC: 34.7 G/DL (ref 33–36)
MCV RBC AUTO: 90.2 FL (ref 80–94)
MONOCYTES # BLD AUTO: 0.97 X10(3)/MCL (ref 0.1–1.3)
MONOCYTES NFR BLD AUTO: 9.9 %
NEUTROPHILS # BLD AUTO: 5.8 X10(3)/MCL (ref 2.1–9.2)
NEUTROPHILS NFR BLD AUTO: 59.1 %
NRBC BLD AUTO-RTO: 0 %
PLATELET # BLD AUTO: 237 X10(3)/MCL (ref 130–400)
PMV BLD AUTO: 11.1 FL (ref 7.4–10.4)
POTASSIUM SERPL-SCNC: 3.8 MMOL/L (ref 3.5–5.1)
PROT SERPL-MCNC: 7.3 GM/DL (ref 6.4–8.3)
RBC # BLD AUTO: 5.23 X10(6)/MCL (ref 4.7–6.1)
SODIUM SERPL-SCNC: 141 MMOL/L (ref 136–145)
WBC # SPEC AUTO: 9.81 X10(3)/MCL (ref 4.5–11.5)

## 2024-05-13 PROCEDURE — 82105 ALPHA-FETOPROTEIN SERUM: CPT | Performed by: INTERNAL MEDICINE

## 2024-05-13 PROCEDURE — 36415 COLL VENOUS BLD VENIPUNCTURE: CPT | Performed by: INTERNAL MEDICINE

## 2024-05-13 PROCEDURE — 80053 COMPREHEN METABOLIC PANEL: CPT | Performed by: INTERNAL MEDICINE

## 2024-05-13 PROCEDURE — 85025 COMPLETE CBC W/AUTO DIFF WBC: CPT | Performed by: INTERNAL MEDICINE

## 2024-05-13 PROCEDURE — 84702 CHORIONIC GONADOTROPIN TEST: CPT | Performed by: INTERNAL MEDICINE

## 2024-05-13 PROCEDURE — 74177 CT ABD & PELVIS W/CONTRAST: CPT | Mod: TC

## 2024-05-13 PROCEDURE — 25500020 PHARM REV CODE 255: Performed by: INTERNAL MEDICINE

## 2024-05-13 PROCEDURE — 83615 LACTATE (LD) (LDH) ENZYME: CPT | Performed by: INTERNAL MEDICINE

## 2024-05-13 RX ADMIN — DIATRIZOATE MEGLUMINE AND DIATRIZOATE SODIUM 30 ML: 600; 100 SOLUTION ORAL; RECTAL at 04:05

## 2024-05-13 RX ADMIN — IOHEXOL 100 ML: 350 INJECTION, SOLUTION INTRAVENOUS at 04:05

## 2024-05-15 LAB — B-HCG SERPL-ACNC: <0.6 IU/L

## 2024-05-16 NOTE — PROGRESS NOTES
Subjective:       Patient ID: Toño Steiner is a 34 y.o. male.    Chief Complaint: Follow-up (Patient has no concerns today)        Diagnosis:  1.  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    Current Treatment:   1.  Observation    Treatment History:  1.  Left orchiectomy performed on 6/27/2022.     HPI:  34-year-old male who noticed discomfort in his left testicle while working out, did a self exam and felt a lump in the left testicle.  He presented to the emergency department at [facility], underwent testicular ultrasound on 05/25/2022 that revealed a solid hypervascular mass in the left testis measuring 1.7 cm consistent with testicular neoplasm.  A CT scan of the chest with contrast done on 05/25/2022 revealed no evidence of metastatic disease.  CT of the abdomen and pelvis with and without contrast done on 05/25/2022 revealed a 1.6 x 1.3 cm left testicular mass with no evidence of metastatic disease.  Patient was admitted to the hospital.  Pre orchiectomy labs showed an AFP of 2, beta-hCG less than 1 and LDH of 187, these are all normal. He underwent a left orchiectomy on 05/27/2022, pathology revealed pure seminoma, 1.7 cm, pathologically staged as pT1a, N0, M0 stage I pure seminoma.  In early July 2022, he had an ultrasound that showed a hydrocele in no other abnormalities.  He presented to see me for initial consult on 07/14/2022.  At that visit, he had recovered from surgery.  He had no major complaints to discuss.  Surveillance CT scans of the chest, abdomen, and pelvis started on 08/29/2022, this showed no evidence of metastatic disease.  Corresponding tumor markers on the same day were negative. Most recent scans and labs was done on 05/13/2024 and continued to show no evidence of disease (LDH and tumor markers are normal).      Interval History:   Patient presents follow up visit.  Overall, he is doing well.  He continues to work, participate in drill for the , and perform  all of his activities of daily living with no limitations.      Past Medical History:   Diagnosis Date    Anxiety     Diverticulitis     Testicular cancer       Past Surgical History:   Procedure Laterality Date    COLONOSCOPY  2021    ORCHIECTOMY, LAPAROSCOPIC Left 05/26/2022     Social History     Socioeconomic History    Marital status:    Tobacco Use    Smoking status: Never    Smokeless tobacco: Current    Tobacco comments:     patient stated that he chews nicotine gum at times   Substance and Sexual Activity    Alcohol use: Not Currently     Comment: patient stated that he has not drank in over 9 years.     Drug use: Never     Social Determinants of Health     Financial Resource Strain: Low Risk  (4/1/2024)    [facility]    Overall Financial Resource Strain (CARDIA)     Difficulty of Paying Living Expenses: Not hard at all   Food Insecurity: No Food Insecurity (4/1/2024)    [facility] Its Subsidi[facility] and Affiliates    Hunger Vital Sign     Worried About Running Out of Food in the Last Year: Never true     Ran Out of Food in the Last Year: Never true   Transportation Needs: No Transportation Needs (4/1/2024)    Received from [facility] Subsidi[facility] and Affiliates    PRAPARE - Transportation     Lack of Transportation (Medical): No     Lack of Transportation (Non-Medical): No   Physical Activity: Sufficiently Active (4/1/2024)    [facility]    Exercise Vital Sign     Days of Exercise per Week: 5 days     Minutes of Exercise per Session: 60 min   Stress: Stress Concern Present (4/1/2024)    Received from [facility]idi[facility] and Affiliates    [place] [employer] - Occupational Stress Questionnaire     Feeling of Stress : To some extent   Housing Stability: Low Risk  (4/4/2023)    [facility] from [facility] Subsidiaries and Affiliates    Housing Stability Vital Sign     Unable to Pay for Housing in the Last Year: No     Number of Places Lived in the Last Year: 1     In the last 12 months, was there a time when you did not have a steady place to sleep or slept in a shelter (including now)?: No      Family History   Problem Relation Name Age of Onset    Hypertension Mother      Multiple myeloma Father      Hypertension Maternal Uncle      Diabetes Maternal Uncle      Hypertension Maternal Grandmother      Hypertension Maternal Grandfather        Review of patient's allergies indicates:  No Known Allergies   Review of Systems   Constitutional:  Negative for appetite change and unexpected weight change.   HENT:  Negative for mouth sores.    Eyes:  Negative for visual disturbance.   Respiratory:  Negative for cough and shortness of breath.    Cardiovascular:  Negative for chest pain.   Gastrointestinal:  Negative for abdominal pain and diarrhea.   Genitourinary:  Negative for frequency.   Musculoskeletal:  Negative for back pain.   Integumentary:  Negative for rash.   Neurological:  Negative for headaches.   Hematological:  Negative for adenopathy.   Psychiatric/Behavioral:  The patient is not nervous/anxious.          Objective:      Physical Exam  Vitals reviewed.   Constitutional:       General: He is awake.      Appearance: Normal appearance.   HENT:      Head: Normocephalic and atraumatic.      Right Ear: Hearing normal.      Left Ear: Hearing normal.      Nose: Nose normal.   Eyes:      General: Lids are normal. Vision grossly intact.      Extraocular Movements: Extraocular movements intact.      Conjunctiva/sclera: Conjunctivae normal.   Cardiovascular:      Rate and Rhythm: Normal rate and regular rhythm.      Pulses:  Normal pulses.      Heart sounds: Normal heart sounds.   Pulmonary:      Effort: Pulmonary effort is normal.      Breath sounds: Normal breath sounds. No wheezing, rhonchi or rales.   Abdominal:      General: Bowel sounds are normal.      Palpations: Abdomen is soft.      Tenderness: There is no abdominal tenderness.   Musculoskeletal:      Cervical back: Full passive range of motion without pain.      Right lower leg: No edema.      Left lower leg: No edema.   Lymphadenopathy:      Cervical: No cervical adenopathy.      Upper Body:      Right upper body: No supraclavicular or axillary adenopathy.      Left upper body: No supraclavicular or axillary adenopathy.   Skin:     General: Skin is warm.   Neurological:      General: No focal deficit present.      Mental Status: He is alert and oriented to person, place, and time.   Psychiatric:         Attention and Perception: Attention normal.         Mood and Affect: Mood and affect normal.         Behavior: Behavior is cooperative.         LABS AND IMAGING REVIEWED IN EPIC          Assessment:   1.  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        Plan:       Patient has a stage I pure seminoma.  We discussed how to proceed moving forward.  I explained that due to the fact that he has stage I, he has a high chance of cure, and if he ever recurred he would still have a very high chance of care, I would opt for observation.  We discussed 1 dose of single agent carboplatin versus radiotherapy, my recommendation is observation.  He voiced understanding and agrees.    We will plan on aggressive surveillance per NCCN guidelines.  He will get a CT scan of the chest, abdomen, and pelvis at month 4 (September 2022), month 8 (January 2023), and month 12 (May 2023).  In year 2 (May 2024) and 3 (May 2025), we will scan him every 6 months, in years 4 (May 2026) and 5 (May 2027) we will scan him every year.  We will also check tumor markers at each visit.    CT  scan of the chest, abdomen, and pelvis done on 05/13/2024 show REGINO.    Return to clinic in 6 months to review lab/scan results    CBC, CMP, LDH, AFP, and BHGC    Edward Hernandez II, MD I, Chitra Mehta LPN, acted solely as a scribe for and in the presence of, Dr. Edward Hernandez who performed the service.

## 2024-05-17 ENCOUNTER — OFFICE VISIT (OUTPATIENT)
Dept: HEMATOLOGY/ONCOLOGY | Facility: CLINIC | Age: 34
End: 2024-05-17
Payer: OTHER GOVERNMENT

## 2024-05-17 VITALS
BODY MASS INDEX: 29.34 KG/M2 | RESPIRATION RATE: 18 BRPM | WEIGHT: 176.13 LBS | HEIGHT: 65 IN | DIASTOLIC BLOOD PRESSURE: 87 MMHG | HEART RATE: 57 BPM | OXYGEN SATURATION: 98 % | SYSTOLIC BLOOD PRESSURE: 130 MMHG

## 2024-05-17 DIAGNOSIS — C62.12 SEMINOMA OF DESCENDED TESTIS, LEFT: Primary | ICD-10-CM

## 2024-05-17 PROCEDURE — 99213 OFFICE O/P EST LOW 20 MIN: CPT | Mod: PBBFAC | Performed by: INTERNAL MEDICINE

## 2024-05-17 PROCEDURE — 99999 PR PBB SHADOW E&M-EST. PATIENT-LVL III: CPT | Mod: PBBFAC,,, | Performed by: INTERNAL MEDICINE

## 2024-05-17 PROCEDURE — 99214 OFFICE O/P EST MOD 30 MIN: CPT | Mod: S$PBB,,, | Performed by: INTERNAL MEDICINE

## 2024-11-15 ENCOUNTER — HOSPITAL ENCOUNTER (OUTPATIENT)
Dept: RADIOLOGY | Facility: HOSPITAL | Age: 34
Discharge: HOME | End: 2024-11-15
Attending: INTERNAL MEDICINE
Payer: OTHER GOVERNMENT

## 2024-11-15 DIAGNOSIS — C62.12 SEMINOMA OF DESCENDED TESTIS, LEFT: ICD-10-CM

## 2024-11-15 PROCEDURE — 74177 CT ABD & PELVIS W/CONTRAST: CPT | Mod: TC

## 2024-11-15 PROCEDURE — 25500020 PHARM REV CODE 255: Performed by: INTERNAL MEDICINE

## 2024-11-15 RX ORDER — DIATRIZOATE MEGLUMINE AND DIATRIZOATE SODIUM 660; 100 MG/ML; MG/ML
30 SOLUTION ORAL; RECTAL
Status: COMPLETED | OUTPATIENT
Start: 2024-11-15 | End: 2024-11-15

## 2024-11-15 RX ADMIN — IOHEXOL 100 ML: 350 INJECTION, SOLUTION INTRAVENOUS at 08:11

## 2024-11-15 RX ADMIN — DIATRIZOATE MEGLUMINE AND DIATRIZOATE SODIUM 30 ML: 660; 100 LIQUID ORAL; RECTAL at 08:11

## 2024-12-06 PROBLEM — Z85.47 HISTORY OF TESTICULAR CANCER: Status: ACTIVE | Noted: 2022-09-13

## 2024-12-08 NOTE — PROGRESS NOTES
Subjective:       Patient ID: Toño Steiner is a 34 y.o. male.    Chief Complaint: Follow-up (Patient has no concerns today)        Diagnosis:  1.  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    Current Treatment:   1.  Observation    Treatment History:  1.  Left orchiectomy performed on 6/27/2022.     HPI:  34-year-old male who noticed discomfort in his left testicle while working out, did a self exam and felt a lump in the left testicle.  He presented to the emergency department at [facility], underwent testicular ultrasound on 05/25/2022 that revealed a solid hypervascular mass in the left testis measuring 1.7 cm consistent with testicular neoplasm.  A CT scan of the chest with contrast done on 05/25/2022 revealed no evidence of metastatic disease.  CT of the abdomen and pelvis with and without contrast done on 05/25/2022 revealed a 1.6 x 1.3 cm left testicular mass with no evidence of metastatic disease.  Patient was admitted to the hospital.  Pre orchiectomy labs showed an AFP of 2, beta-hCG less than 1 and LDH of 187, these are all normal. He underwent a left orchiectomy on 05/27/2022, pathology revealed pure seminoma, 1.7 cm, pathologically staged as pT1a, N0, M0 stage I pure seminoma.  In early July 2022, he had an ultrasound that showed a hydrocele in no other abnormalities.  He presented to see me for initial consult on 07/14/2022.  At that visit, he had recovered from surgery.  He had no major complaints to discuss.  Surveillance CT scans of the chest, abdomen, and pelvis started on 08/29/2022, this showed no evidence of metastatic disease.  Corresponding tumor markers on the same day were negative. Most recent scans and labs were done on 11/15/2024 and continued to show no evidence of disease (LDH and tumor markers are normal).      Interval History:   Patient presents follow up visit.  Overall, he is doing well.  He has no major issues.  He does have a bump on his lip that he says  has been there.  He states that this is not changing, not painful.      Past Medical History:   Diagnosis Date    Anxiety     Diverticulitis     Testicular cancer       Past Surgical History:   Procedure Laterality Date    COLONOSCOPY  2021    ORCHIECTOMY, LAPAROSCOPIC Left 05/26/2022     Social History     Socioeconomic History    Marital status:    Tobacco Use    Smoking status: Never    Smokeless tobacco: Current    Tobacco comments:     patient stated that he chews nicotine gum at times   Substance and Sexual Activity    Alcohol use: Not Currently     Comment: patient stated that he has not drank in over 9 years.     Drug use: Never     Social Drivers of Health     Financial Resource Strain: Low Risk  (4/1/2024)    Received from [facility] and Its Subsidiaries and Affiliates    Overall Financial Resource Strain (CARDIA)     Difficulty of Paying Living Expenses: Not hard at all   Food Insecurity: No Food Insecurity (4/1/2024)    Received from [place] [facility] and Its Subsidiaries and Affiliates    Hunger Vital Sign     Worried About Running Out of Food in the Last Year: Never true     Ran Out of Food in the Last Year: Never true   Transportation Needs: No Transportation Needs (4/1/2024)    Received from [facility] and Its Subsidiaries and Affiliates    PRAPARE - Transportation     Lack of Transportation (Medical): No     Lack of Transportation (Non-Medical): No   Physical Activity: Sufficiently Active (4/1/2024)    [facility] [place] [facility]    Exercise Vital Sign     Days of Exercise per Week: 5 days     Minutes of Exercise per Session: 60 min   Stress: Stress Concern Present (4/1/2024)    Received from [employer] [facility] and Its Subsidiaries and Affiliates    [place] of Occupational  Health - Occupational Stress Questionnaire     Feeling of Stress : To some extent   Housing Stability: Low Risk  (4/4/2023)    [facility] and Its Subsidiaries and Affiliates    Housing Stability Vital Sign     Unable to Pay for Housing in the Last Year: No     Number of Places Lived in the Last Year: 1     In the last 12 months, was there a time when you did not have a steady place to sleep or slept in a shelter (including now)?: No      Family History   Problem Relation Name Age of Onset    Hypertension Mother      Multiple myeloma Father      Hypertension Maternal Uncle      Diabetes Maternal Uncle      Hypertension Maternal Grandmother      Hypertension Maternal Grandfather        Review of patient's allergies indicates:  No Known Allergies   Review of Systems   Constitutional:  Negative for appetite change and unexpected weight change.   HENT:  Negative for mouth sores.    Eyes:  Negative for visual disturbance.   Respiratory:  Negative for cough and shortness of breath.    Cardiovascular:  Negative for chest pain.   Gastrointestinal:  Negative for abdominal pain and diarrhea.   Genitourinary:  Negative for frequency.   Musculoskeletal:  Negative for back pain.   Integumentary:  Negative for rash.   Neurological:  Negative for headaches.   Hematological:  Negative for adenopathy.   Psychiatric/Behavioral:  The patient is not nervous/anxious.          Objective:      Physical Exam  Vitals reviewed.   Constitutional:       General: He is awake.      Appearance: Normal appearance.   HENT:      Head: Normocephalic and atraumatic.      Right Ear: Hearing normal.      Left Ear: Hearing normal.      Nose: Nose normal.   Eyes:      General: Lids are normal. Vision grossly intact.      Extraocular Movements: Extraocular movements intact.      Conjunctiva/sclera: Conjunctivae normal.   Cardiovascular:      Rate and Rhythm: Normal rate and regular rhythm.      Pulses:  Normal pulses.      Heart sounds: Normal heart sounds.   Pulmonary:      Effort: Pulmonary effort is normal.      Breath sounds: Normal breath sounds. No wheezing, rhonchi or rales.   Abdominal:      General: Bowel sounds are normal.      Palpations: Abdomen is soft.      Tenderness: There is no abdominal tenderness.   Musculoskeletal:      Cervical back: Full passive range of motion without pain.      Right lower leg: No edema.      Left lower leg: No edema.   Lymphadenopathy:      Cervical: No cervical adenopathy.      Upper Body:      Right upper body: No supraclavicular or axillary adenopathy.      Left upper body: No supraclavicular or axillary adenopathy.   Skin:     General: Skin is warm.   Neurological:      General: No focal deficit present.      Mental Status: He is alert and oriented to person, place, and time.   Psychiatric:         Attention and Perception: Attention normal.         Mood and Affect: Mood and affect normal.         Behavior: Behavior is cooperative.         LABS AND IMAGING REVIEWED IN EPIC          Assessment:   1.  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        Plan:       Patient has a stage I pure seminoma.  We discussed how to proceed moving forward.  I explained that due to the fact that he has stage I, he has a high chance of cure, and if he ever recurred he would still have a very high chance of care, I would opt for observation.  We discussed 1 dose of single agent carboplatin versus radiotherapy, my recommendation is observation.  He voiced understanding and agrees.    We will plan on aggressive surveillance per NCCN guidelines.  He will get a CT scan of the chest, abdomen, and pelvis at month 4 (September 2022), month 8 (January 2023), and month 12 (May 2023).  In year 2 (May 2024) and 3 (May 2025), we will scan him every 6 months, in years 4 (May 2026) and 5 (May 2027) we will scan him every year.  We will also check tumor markers at each visit.    CT  scan of the chest, abdomen, and pelvis done on 11/15/2024 show REGINO.    Return to clinic in 5 months to review lab/scan results    CBC, CMP, LDH, AFP, and GC    Edward Hernandez II, MD

## 2024-12-09 ENCOUNTER — OFFICE VISIT (OUTPATIENT)
Dept: HEMATOLOGY/ONCOLOGY | Facility: CLINIC | Age: 34
End: 2024-12-09
Payer: OTHER GOVERNMENT

## 2024-12-09 VITALS
BODY MASS INDEX: 28.62 KG/M2 | HEART RATE: 50 BPM | SYSTOLIC BLOOD PRESSURE: 125 MMHG | WEIGHT: 171.94 LBS | DIASTOLIC BLOOD PRESSURE: 85 MMHG | RESPIRATION RATE: 18 BRPM | OXYGEN SATURATION: 99 %

## 2024-12-09 DIAGNOSIS — Z85.47 HISTORY OF TESTICULAR CANCER: Primary | ICD-10-CM

## 2024-12-09 PROCEDURE — 99999 PR PBB SHADOW E&M-EST. PATIENT-LVL III: CPT | Mod: PBBFAC,,, | Performed by: INTERNAL MEDICINE

## 2024-12-09 PROCEDURE — 99213 OFFICE O/P EST LOW 20 MIN: CPT | Mod: PBBFAC | Performed by: INTERNAL MEDICINE

## 2025-05-02 ENCOUNTER — HOSPITAL ENCOUNTER (OUTPATIENT)
Dept: RADIOLOGY | Facility: HOSPITAL | Age: 35
Discharge: HOME | End: 2025-05-02
Attending: INTERNAL MEDICINE
Payer: OTHER GOVERNMENT

## 2025-05-02 DIAGNOSIS — Z85.47 HISTORY OF TESTICULAR CANCER: ICD-10-CM

## 2025-05-02 PROCEDURE — 71260 CT THORAX DX C+: CPT | Mod: TC

## 2025-05-02 PROCEDURE — 25500020 PHARM REV CODE 255: Performed by: INTERNAL MEDICINE

## 2025-05-02 RX ORDER — DIATRIZOATE MEGLUMINE AND DIATRIZOATE SODIUM 660; 100 MG/ML; MG/ML
30 SOLUTION ORAL; RECTAL
Status: COMPLETED | OUTPATIENT
Start: 2025-05-02 | End: 2025-05-02

## 2025-05-02 RX ADMIN — IOHEXOL 75 ML: 350 INJECTION, SOLUTION INTRAVENOUS at 02:05

## 2025-05-02 RX ADMIN — DIATRIZOATE MEGLUMINE AND DIATRIZOATE SODIUM 30 ML: 660; 100 LIQUID ORAL; RECTAL at 02:05

## 2025-05-07 NOTE — PROGRESS NOTES
Subjective:       Patient ID: Toño Steiner is a 35 y.o. male.    Chief Complaint: Follow-up (Patient has no concerns today)        Diagnosis:  1.  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    Current Treatment:   1.  Observation    Treatment History:  1.  Left orchiectomy performed on 6/27/2022.     HPI:  35-year-old male who noticed discomfort in his left testicle while working out, did a self exam and felt a lump in the left testicle.  He presented to the emergency department at [facility], underwent testicular ultrasound on 05/25/2022 that revealed a solid hypervascular mass in the left testis measuring 1.7 cm consistent with testicular neoplasm.  A CT scan of the chest with contrast done on 05/25/2022 revealed no evidence of metastatic disease.  CT of the abdomen and pelvis with and without contrast done on 05/25/2022 revealed a 1.6 x 1.3 cm left testicular mass with no evidence of metastatic disease.  Patient was admitted to the hospital.  Pre orchiectomy labs showed an AFP of 2, beta-hCG less than 1 and LDH of 187, these are all normal. He underwent a left orchiectomy on 05/27/2022, pathology revealed pure seminoma, 1.7 cm, pathologically staged as pT1a, N0, M0 stage I pure seminoma.  In early July 2022, he had an ultrasound that showed a hydrocele in no other abnormalities.  He presented to see me for initial consult on 07/14/2022.  At that visit, he had recovered from surgery.  He had no major complaints to discuss.  Surveillance CT scans of the chest, abdomen, and pelvis started on 08/29/2022, this showed no evidence of metastatic disease.  Corresponding tumor markers on the same day were negative. Most recent scans and labs were done on 05/02/2025 and continued to show no evidence of disease (LDH and tumor markers are normal).      Interval History:   Patient presents follow up visit.  Overall, he is doing well.  He has no major issues.  He has no major issues to discuss at this  time.       Past Medical History:   Diagnosis Date    Anxiety     Diverticulitis     Testicular cancer       Past Surgical History:   Procedure Laterality Date    COLONOSCOPY  2021    ORCHIECTOMY, LAPAROSCOPIC Left 05/26/2022     Social History     Socioeconomic History    Marital status:    Tobacco Use    Smoking status: Never    Smokeless tobacco: Current    Tobacco comments:     patient stated that he chews nicotine gum at times   Substance and Sexual Activity    Alcohol use: Not Currently     Comment: patient stated that he has not drank in over 9 years.     Drug use: Never     Social Drivers of Health     Financial Resource Strain: Low Risk  (4/1/2024)    Received from [facility] and Its Subsidiaries and Affiliates    Overall Financial Resource Strain (CARDIA)     Difficulty of Paying Living Expenses: Not hard at all   Food Insecurity: No Food Insecurity (4/1/2024)    Received from [facility] and Its Subsidiaries and Affiliates    Hunger Vital Sign     Worried About Running Out of Food in the Last Year: Never true     Ran Out of Food in the Last Year: Never true   Transportation Needs: No Transportation Needs (4/1/2024)    Received from [place] [facility] Its Subsidiaries and Affiliates    PRAPARE - Transportation     Lack of Transportation (Medical): No     Lack of Transportation (Non-Medical): No   Physical Activity: Sufficiently Active (4/1/2024)    Received from [facility] and Its Subsidiaries and Affiliates    Exercise Vital Sign     Days of Exercise per Week: 5 days     Minutes of Exercise per Session: 60 min   Stress: Stress Concern Present (4/1/2024)    Received from [facility] and Its Subsidiaries and Affiliates    [place] of Occupational Health - Occupational Stress Questionnaire     Feeling of  Stress : To some extent   Housing Stability: Low Risk  (4/4/2023)    [facility] from [facility] and Its Subsidiaries and Affiliates    Housing Stability Vital Sign     Unable to Pay for Housing in the Last Year: No     Number of Places Lived in the Last Year: 1     In the last 12 months, was there a time when you did not have a steady place to sleep or slept in a shelter (including now)?: No      Family History   Problem Relation Name Age of Onset    Hypertension Mother      Multiple myeloma Father      Hypertension Maternal Uncle      Diabetes Maternal Uncle      Hypertension Maternal Grandmother      Hypertension Maternal Grandfather        Review of patient's allergies indicates:  No Known Allergies   Review of Systems   Constitutional:  Negative for appetite change and unexpected weight change.   HENT:  Negative for mouth sores.    Eyes:  Negative for visual disturbance.   Respiratory:  Negative for cough and shortness of breath.    Cardiovascular:  Negative for chest pain.   Gastrointestinal:  Negative for abdominal pain and diarrhea.   Genitourinary:  Negative for frequency.   Musculoskeletal:  Negative for back pain.   Integumentary:  Negative for rash.   Neurological:  Negative for headaches.   Hematological:  Negative for adenopathy.   Psychiatric/Behavioral:  The patient is not nervous/anxious.          Objective:      Physical Exam  Vitals reviewed.   Constitutional:       General: He is awake.      Appearance: Normal appearance.   HENT:      Head: Normocephalic and atraumatic.      Right Ear: Hearing normal.      Left Ear: Hearing normal.      Nose: Nose normal.   Eyes:      General: Lids are normal. Vision grossly intact.      Extraocular Movements: Extraocular movements intact.      Conjunctiva/sclera: Conjunctivae normal.   Cardiovascular:      Rate and Rhythm: Normal rate and regular rhythm.      Pulses: Normal pulses.      Heart sounds: Normal heart sounds.    Pulmonary:      Effort: Pulmonary effort is normal.      Breath sounds: Normal breath sounds. No wheezing, rhonchi or rales.   Abdominal:      General: Bowel sounds are normal.      Palpations: Abdomen is soft.      Tenderness: There is no abdominal tenderness.   Musculoskeletal:      Cervical back: Full passive range of motion without pain.      Right lower leg: No edema.      Left lower leg: No edema.   Lymphadenopathy:      Cervical: No cervical adenopathy.      Upper Body:      Right upper body: No supraclavicular or axillary adenopathy.      Left upper body: No supraclavicular or axillary adenopathy.   Skin:     General: Skin is warm.   Neurological:      General: No focal deficit present.      Mental Status: He is alert and oriented to person, place, and time.   Psychiatric:         Attention and Perception: Attention normal.         Mood and Affect: Mood and affect normal.         Behavior: Behavior is cooperative.         LABS AND IMAGING REVIEWED IN EPIC          Assessment:   1.  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        Plan:       Patient has a stage I pure seminoma.  We discussed how to proceed moving forward.  I explained that due to the fact that he has stage I, he has a high chance of cure, and if he ever recurred he would still have a very high chance of care, I would opt for observation.  We discussed 1 dose of single agent carboplatin versus radiotherapy, my recommendation is observation.  He voiced understanding and agrees.    We will plan on aggressive surveillance per NCCN guidelines.  He will get a CT scan of the chest, abdomen, and pelvis at month 4 (September 2022), month 8 (January 2023), and month 12 (May 2023).  In year 2 (May 2024) and 3 (May 2025), we will scan him every 6 months, in years 4 (May 2026) and 5 (May 2027) we will scan him every year.  We will also check tumor markers at each visit.    CT scan of the chest, abdomen, and pelvis done on 05/02/2025  show REGINO.    Return to clinic in 12 months to review lab/scan results    CBC, CMP, LDH, AFP, and BHGC    Visit today included increased complexity associated with the care of the episodic problem testicular cancer, addressing and managing the longitudinal care of the patient's testicular cancer.        Edward Hernandez II, MD

## 2025-05-08 ENCOUNTER — OFFICE VISIT (OUTPATIENT)
Dept: HEMATOLOGY/ONCOLOGY | Facility: CLINIC | Age: 35
End: 2025-05-08
Payer: OTHER GOVERNMENT

## 2025-05-08 VITALS
BODY MASS INDEX: 28.66 KG/M2 | HEIGHT: 65 IN | OXYGEN SATURATION: 100 % | RESPIRATION RATE: 18 BRPM | HEART RATE: 57 BPM | DIASTOLIC BLOOD PRESSURE: 81 MMHG | SYSTOLIC BLOOD PRESSURE: 122 MMHG | WEIGHT: 172 LBS

## 2025-05-08 DIAGNOSIS — C62.12 SEMINOMA OF DESCENDED TESTIS, LEFT: ICD-10-CM

## 2025-05-08 DIAGNOSIS — Z85.47 HISTORY OF TESTICULAR CANCER: ICD-10-CM

## 2025-05-08 PROCEDURE — 99213 OFFICE O/P EST LOW 20 MIN: CPT | Mod: PBBFAC | Performed by: INTERNAL MEDICINE

## 2025-05-08 PROCEDURE — 99999 PR PBB SHADOW E&M-EST. PATIENT-LVL III: CPT | Mod: PBBFAC,,, | Performed by: INTERNAL MEDICINE
